# Patient Record
Sex: MALE | Race: WHITE | NOT HISPANIC OR LATINO | Employment: FULL TIME | URBAN - METROPOLITAN AREA
[De-identification: names, ages, dates, MRNs, and addresses within clinical notes are randomized per-mention and may not be internally consistent; named-entity substitution may affect disease eponyms.]

---

## 2018-06-21 ENCOUNTER — HISTORICAL (OUTPATIENT)
Dept: ADMINISTRATIVE | Facility: HOSPITAL | Age: 48
End: 2018-06-21

## 2018-06-21 LAB
ABS NEUT (OLG): 3
ALBUMIN SERPL-MCNC: 4.8 GM/DL (ref 3.4–5)
ALBUMIN/GLOB SERPL: 1.71 {RATIO} (ref 1.5–2.5)
ALP SERPL-CCNC: 38 UNIT/L (ref 38–126)
ALT SERPL-CCNC: 19 UNIT/L (ref 7–52)
APPEARANCE, UA: CLEAR
AST SERPL-CCNC: 23 UNIT/L (ref 15–37)
BACTERIA #/AREA URNS AUTO: ABNORMAL /HPF
BILIRUB SERPL-MCNC: 0.8 MG/DL (ref 0.2–1)
BILIRUB UR QL STRIP: NEGATIVE MG/DL
BILIRUBIN DIRECT+TOT PNL SERPL-MCNC: 0.2 MG/DL (ref 0–0.5)
BILIRUBIN DIRECT+TOT PNL SERPL-MCNC: 0.6 MG/DL
BUN SERPL-MCNC: 19 MG/DL (ref 7–18)
CALCIUM SERPL-MCNC: 9.2 MG/DL (ref 8.5–10)
CHLORIDE SERPL-SCNC: 104 MMOL/L (ref 98–107)
CHOLEST SERPL-MCNC: 235 MG/DL (ref 0–200)
CHOLEST/HDLC SERPL: 4.9 {RATIO}
CO2 SERPL-SCNC: 26 MMOL/L (ref 21–32)
COLOR UR: YELLOW
CREAT SERPL-MCNC: 1.03 MG/DL (ref 0.6–1.3)
ERYTHROCYTE [DISTWIDTH] IN BLOOD BY AUTOMATED COUNT: 12.9 % (ref 11.5–17)
GLOBULIN SER-MCNC: 2.8 GM/DL (ref 1.2–3)
GLUCOSE (UA): NEGATIVE MG/DL
GLUCOSE SERPL-MCNC: 99 MG/DL (ref 74–106)
HCT VFR BLD AUTO: 42.8 % (ref 42–52)
HDLC SERPL-MCNC: 48 MG/DL (ref 35–60)
HGB BLD-MCNC: 14.2 GM/DL (ref 14–18)
HGB UR QL STRIP: ABNORMAL UNIT/L
KETONES UR QL STRIP: NEGATIVE MG/DL
LDLC SERPL CALC-MCNC: 155 MG/DL (ref 0–129)
LEUKOCYTE ESTERASE UR QL STRIP: NEGATIVE UNIT/L
LYMPHOCYTES # BLD AUTO: 1.9 X10(3)/MCL (ref 0.6–3.4)
LYMPHOCYTES NFR BLD AUTO: 33.2 % (ref 13–40)
MCH RBC QN AUTO: 31.7 PG (ref 27–31.2)
MCHC RBC AUTO-ENTMCNC: 33 GM/DL (ref 32–36)
MCV RBC AUTO: 96 FL (ref 80–94)
MONOCYTES # BLD AUTO: 0.8 X10(3)/MCL (ref 0–1.8)
MONOCYTES NFR BLD AUTO: 13.5 % (ref 0.1–24)
NEUTROPHILS NFR BLD AUTO: 53.3 % (ref 47–80)
NITRITE UR QL STRIP.AUTO: NEGATIVE
PH UR STRIP: 5.5 [PH]
PLATELET # BLD AUTO: 235 X10(3)/MCL (ref 130–400)
PMV BLD AUTO: 8.8 FL
POTASSIUM SERPL-SCNC: 4.4 MMOL/L (ref 3.5–5.1)
PROT SERPL-MCNC: 7.6 GM/DL (ref 6.4–8.2)
PROT UR QL STRIP: NEGATIVE MG/DL
PSA SERPL-MCNC: 1.2 NG/ML (ref 0–2.5)
RBC # BLD AUTO: 4.48 X10(6)/MCL (ref 4.7–6.1)
RBC #/AREA URNS HPF: ABNORMAL /HPF
SODIUM SERPL-SCNC: 138 MMOL/L (ref 136–145)
SP GR UR STRIP: 1.02
SQUAMOUS EPITHELIAL, UA: ABNORMAL /LPF
TRIGL SERPL-MCNC: 195 MG/DL (ref 30–150)
UROBILINOGEN UR STRIP-ACNC: 0.2 MG/DL
VLDLC SERPL CALC-MCNC: 39 MG/DL
WBC # SPEC AUTO: 5.7 X10(3)/MCL (ref 4.5–11.5)
WBC #/AREA URNS AUTO: ABNORMAL /[HPF]

## 2019-09-14 ENCOUNTER — HISTORICAL (OUTPATIENT)
Dept: URGENT CARE | Facility: CLINIC | Age: 49
End: 2019-09-14

## 2019-09-14 ENCOUNTER — HISTORICAL (OUTPATIENT)
Dept: ADMINISTRATIVE | Facility: HOSPITAL | Age: 49
End: 2019-09-14

## 2019-09-14 LAB
ABS NEUT (OLG): 7.44 X10(3)/MCL (ref 2.1–9.2)
BASOPHILS # BLD AUTO: 0 X10(3)/MCL (ref 0–0.2)
BASOPHILS NFR BLD AUTO: 0 %
EOSINOPHIL # BLD AUTO: 0 X10(3)/MCL (ref 0–0.9)
EOSINOPHIL NFR BLD AUTO: 0 %
ERYTHROCYTE [DISTWIDTH] IN BLOOD BY AUTOMATED COUNT: 13.2 % (ref 11.5–17)
HCT VFR BLD AUTO: 40.5 % (ref 42–52)
HGB BLD-MCNC: 13.5 GM/DL (ref 14–18)
INFLUENZA A ANTIGEN, POC: NEGATIVE
INFLUENZA B ANTIGEN, POC: NEGATIVE
LYMPHOCYTES # BLD AUTO: 0.8 X10(3)/MCL (ref 0.6–4.6)
LYMPHOCYTES NFR BLD AUTO: 9 %
MCH RBC QN AUTO: 30.9 PG (ref 27–31)
MCHC RBC AUTO-ENTMCNC: 33.3 GM/DL (ref 33–36)
MCV RBC AUTO: 92.7 FL (ref 80–94)
MONOCYTES # BLD AUTO: 1 X10(3)/MCL (ref 0.1–1.3)
MONOCYTES NFR BLD AUTO: 11 %
NEUTROPHILS # BLD AUTO: 7.44 X10(3)/MCL (ref 2.1–9.2)
NEUTROPHILS NFR BLD AUTO: 79 %
PLATELET # BLD AUTO: 223 X10(3)/MCL (ref 130–400)
PMV BLD AUTO: 10.5 FL (ref 9.4–12.4)
RAPID GROUP A STREP (OHS): NEGATIVE
RBC # BLD AUTO: 4.37 X10(6)/MCL (ref 4.7–6.1)
WBC # SPEC AUTO: 9.4 X10(3)/MCL (ref 4.5–11.5)

## 2019-10-22 ENCOUNTER — HISTORICAL (OUTPATIENT)
Dept: ADMINISTRATIVE | Facility: HOSPITAL | Age: 49
End: 2019-10-22

## 2022-04-10 ENCOUNTER — HISTORICAL (OUTPATIENT)
Dept: ADMINISTRATIVE | Facility: HOSPITAL | Age: 52
End: 2022-04-10

## 2022-04-29 VITALS
BODY MASS INDEX: 29.98 KG/M2 | DIASTOLIC BLOOD PRESSURE: 88 MMHG | WEIGHT: 209.69 LBS | HEIGHT: 70 IN | OXYGEN SATURATION: 96 % | SYSTOLIC BLOOD PRESSURE: 123 MMHG | SYSTOLIC BLOOD PRESSURE: 144 MMHG | OXYGEN SATURATION: 97 % | DIASTOLIC BLOOD PRESSURE: 78 MMHG | DIASTOLIC BLOOD PRESSURE: 84 MMHG | WEIGHT: 209.44 LBS | HEIGHT: 70 IN | BODY MASS INDEX: 30.02 KG/M2 | SYSTOLIC BLOOD PRESSURE: 124 MMHG

## 2022-05-03 NOTE — HISTORICAL [facility]
This is a historical note converted from Britta. Formatting and pictures may have been removed.  Please reference Britta for original formatting and attached multimedia. Chief Complaint  fever off and on x 5 days, dry cough, postnasal drip, sinus congestion  rash on back x 3 days  History of Present Illness  49-year-old white male presents to clinic?complaining of dry cough fever postnasal drip for the past 5 days. ?Now with a?red mildly itchy rash on the back. ?Went to [facility] on Monday flu was negative but was put on Tamiflu anyway because?his symptoms indicated flu?possibly. ?Has been taking the Tamiflu but no change if he does not take Advil or Tylenol his fever will return  Review of Systems  Constitutional_Per HPI  Eye_Per HPI  ENMT_Per HPI  Cardiovascular_Per HPI  Respiratory: Per HPI  Gastrointestinal_Per HPI  Muscular_Per HPI  Neuro: Per HPI  Physical Exam  Vitals & Measurements  T:?37.7? ?C (Oral)? HR:?127(Peripheral)? RR:?18? BP:?123/84? SpO2:?96%?  HT:?178?cm? WT:?95.1?kg? BMI:?30.02?  General_well-developed well-nourished in no acute distress  Eye_clear conjunctiva  HENT_TMs/ear canals clear, oropharynx without erythema/exudate, oropharynx and nasal mucosal surfaces moist, no maxillary/frontal sinus tenderness to palpation  Neck_no lymphadenopathy  Respiratory_rhonchi left lower lobe  Cardiovascular_regular rate and rhythm without murmurs, gallops or rubs  Integumentary_no rashes  ?  ?  Assessment/Plan  1.?Fever?R50.9,?Fever?R50.9  Ordered:  cefTRIAXone, 1 gm, form: Injection, IM, Once, first dose 09/14/19 11:00:00 CDT, stop date 09/14/19 11:00:00 CDT  CBC w/ Auto Diff, Stat collect, 09/14/19 10:08:00 CDT, Blood, Order for future visit, Stop date 09/14/19 10:08:00 CDT, Lab Collect, No Charge, Fever, 09/14/19 10:08:00 CDT  ?  2.?Pneumonia?J18.9  ?Start taking the antibiotic as soon as possible.? Cough medicine may make you drowsy do not drink or drive if you take it.? You need a repeat chest x-ray in 4 to  6 weeks to make sure the pneumonia has resolved.? Seek medical attention immediately if your symptoms persist or worsen. ?Tylenol or ibuprofen as needed for fever. ?Stay hydrated get plenty of rest  Ordered:  cefTRIAXone, 1 gm, form: Injection, IM, Once, first dose 09/14/19 11:00:00 CDT, stop date 09/14/19 11:00:00 CDT  ?   Problem List/Past Medical History  Ongoing  Hypercholesteremia  Obesity  Wellness examination  Historical  No qualifying data  Procedure/Surgical History  Rhinoplasty   Medications  aspirin 81 mg oral tablet, 81 mg= 1 tab(s), Oral, Daily,? ?Not Taking, Completed Rx  oseltamivir 75 mg oral capsule, 75 mg= 1 cap(s), Oral, BID,? ?Not taking  Rocephin 1 g injection, 1 gm, IM, Once  Allergies  No Known Medication Allergies  Social History  Abuse/Neglect  No, 09/14/2019  Alcohol  1-2 times per week, 04/04/2016  Tobacco  Former smoker, quit more than 30 days ago, N/A, 09/14/2019  Family History  Family history is negative  Health Maintenance  Health Maintenance  ???Pending?(in the next year)  ??? ??OverDue  ??? ? ? ?Diabetes Screening due??and every?  ??? ? ? ?Aspirin Therapy for CVD Prevention due??04/04/17??and every 1??year(s)  ??? ? ? ?Alcohol Misuse Screening due??01/01/19??and every 1??year(s)  ??? ??Due?  ??? ? ? ?ADL Screening due??09/14/19??and every 1??year(s)  ??? ? ? ?Colorectal Screening due??09/14/19??and every?  ??? ? ? ?Depression Screening due??09/14/19??and every?  ??? ? ? ?Influenza Vaccine due??09/14/19??and every?  ??? ? ? ?Tetanus Vaccine due??09/14/19??and every 10??year(s)  ??? ??Due In Future?  ??? ? ? ?Obesity Screening not due until??01/01/20??and every 1??year(s)  ??? ? ? ?Blood Pressure Screening not due until??09/13/20??and every 1??year(s)  ??? ? ? ?Body Mass Index Check not due until??09/13/20??and every 1??year(s)  ???Satisfied?(in the past 1 year)  ??? ??Satisfied?  ??? ? ? ?Blood Pressure Screening on??09/14/19.??Satisfied by Alex Solano  ??? ? ? ?Body Mass  Index Check on??09/14/19.??Satisfied by Alex Solano  ??? ? ? ?Obesity Screening on??09/14/19.??Satisfied by Alex Solano  ?  Diagnostic Results  Left lower lobe pneumonia

## 2022-05-03 NOTE — HISTORICAL [facility]
This is a historical note converted from [place]. Formatting and pictures may have been removed.  Please reference Britta for original formatting and attached multimedia. Chief Complaint  seen on 9/14, dx with pneumonia. requesting f/u. c/o head congestion  History of Present Illness  49-year-old white male presents to clinic for?his pneumonia diagnosis?for repeat chest x-ray. ?States after about 4 days of taking antibiotics fever broke and his symptoms resolved. ?Today also complaining of postnasal drip. ?States he does have a history of allergic rhinitis.? Has been taking Allegra-D only.  Review of Systems  Constitutional_Per HPI  Eye_Per HPI  ENMT_Per HPI  Cardiovascular_Per HPI  Respiratory: Per HPI  Gastrointestinal_Per HPI  Muscular_Per HPI  Neuro: Per HPI  Physical Exam  Vitals & Measurements  T:?37.1? ?C (Oral)? HR:?99(Peripheral)? RR:?18? BP:?144/88? SpO2:?97%?  HT:?178?cm? WT:?95?kg? BMI:?29.98?  General_well-developed well-nourished in no acute distress  Eye_ clear conjunctiva, eyelids normal  HENT_nasal drip is present.? No sinus tenderness to palpation.  Neck_full range of motion  Respiratory_clear to auscultation bilaterally  Cardiovascular_regular rate and rhythm without murmurs, gallops or rubs  Integumentary_no rashes or skin lesions present  ?  Assessment/Plan  1.?History of pneumonia?Z87.01  ?Your x-ray has been sent to radiologist for an official report we will call you we will get the results.  Ordered:  Office/Outpatient Visit Level 3 Established [id number] PC, History of pneumonia  Allergic rhinitis, [facility], 10/22/19 12:46:00 CDT  XR Chest 2 Views, Routine, 10/22/19 12:45:00 CDT, Other (please specify), Pneumonia 5 weeks ago, None, Ambulatory, Rad Type, History of pneumonia, Not Scheduled, 10/22/19 12:45:00 CDT  ?  2.?Allergic rhinitis?J30.9  Start taking an allergy pill daily such as claritin, zyrtec, allegra or xyzal. Also start using a nasal steroid spray such as flonase or nasacort. They can  all be purchased over the counter. ?Monitor for fever take Tylenol or ibuprofen as needed for fever pain or headache. ?Stay hydrated. ?Get plenty rest. ?If symptoms persist would recommend follow-up with an ear nose throat doctor for further evaluation  Ordered:  Office/Outpatient Visit Level 3 Established 58010 PC, History of pneumonia  Allergic rhinitis, [facility], 10/22/19 12:46:00 CDT  ?  Orders:  predniSONE, 40 mg = 2 tab(s), Oral, Daily, X 5 day(s), # 10 tab(s), 0 Refill(s), Pharmacy: [facility]  DWAYNE Chang   Problem List/Past Medical History  Ongoing  Hypercholesteremia  Obesity  Wellness examination  Historical  No qualifying data  Procedure/Surgical History  Rhinoplasty   Medications  codeine-guaifenesin 10 mg-100 mg/5 mL oral syrup, 10 mL, Oral, q6hr,? ?Not Taking, Completed Rx  diclofenac sodium 75 mg oral delayed release tablet, 75 mg= 1 tab(s), Oral, BID,? ?Not taking  doxycycline monohydrate 100 mg oral capsule, 100 mg= 1 cap(s), Oral, BID,? ?Not Taking, Completed Rx  gabapentin 300 mg oral capsule, 300 mg= 1 cap(s), Oral, qPM,? ?Not Taking, Completed Rx  oseltamivir 75 mg oral capsule, 75 mg= 1 cap(s), Oral, BID,? ?Not Taking, Completed Rx  prednisONE 20 mg oral tablet, 40 mg= 2 tab(s), Oral, Daily  Allergies  No Known Medication Allergies  Social History  Abuse/Neglect  No, 10/22/2019  No, 09/14/2019  Alcohol  1-2 times per week, 04/04/2016  Substance Use  Never, 10/22/2019  Tobacco  Former smoker, quit more than 30 days ago, N/A, 10/22/2019  Family History  Family history is negative  Health Maintenance  Health Maintenance  ???Pending?(in the next year)  ??? ??OverDue  ??? ? ? ?Diabetes Screening due??and every?  ??? ? ? ?Aspirin Therapy for CVD Prevention due??04/04/17??and every 1??year(s)  ??? ? ? ?Alcohol Misuse Screening due??01/01/19??and every 1??year(s)  ??? ??Due?  ??? ? ? ?ADL Screening due??10/22/19??and every 1??year(s)  ??? ? ? ?Colorectal Screening due??10/22/19??and every?  ???  ? ? ?Depression Screening due??10/22/19??and every?  ??? ? ? ?Influenza Vaccine due??10/22/19??and every?  ??? ? ? ?Tetanus Vaccine due??10/22/19??and every 10??year(s)  ??? ??Due In Future?  ??? ? ? ?Obesity Screening not due until??01/01/20??and every 1??year(s)  ??? ? ? ?Blood Pressure Screening not due until??10/21/20??and every 1??year(s)  ??? ? ? ?Body Mass Index Check not due until??10/21/20??and every 1??year(s)  ???Satisfied?(in the past 1 year)  ??? ??Satisfied?  ??? ? ? ?Blood Pressure Screening on??10/22/19.??Satisfied by Uma Elise  ??? ? ? ?Body Mass Index Check on??10/22/19.??Satisfied by Uma Elise  ??? ? ? ?Influenza Vaccine on??10/22/19.??Satisfied by Uma Elise  ??? ? ? ?Obesity Screening on??10/22/19.??Satisfied by Uma Elise  ?  Diagnostic Results  No infiltrate

## 2022-05-25 ENCOUNTER — OFFICE VISIT (OUTPATIENT)
Dept: URGENT CARE | Facility: CLINIC | Age: 52
End: 2022-05-25
Payer: COMMERCIAL

## 2022-05-25 VITALS
TEMPERATURE: 99 F | RESPIRATION RATE: 18 BRPM | HEART RATE: 93 BPM | DIASTOLIC BLOOD PRESSURE: 85 MMHG | SYSTOLIC BLOOD PRESSURE: 145 MMHG | BODY MASS INDEX: 29.35 KG/M2 | HEIGHT: 70 IN | OXYGEN SATURATION: 99 % | WEIGHT: 205 LBS

## 2022-05-25 DIAGNOSIS — N50.819 PAIN IN TESTICLE, UNSPECIFIED LATERALITY: Primary | ICD-10-CM

## 2022-05-25 PROCEDURE — 99214 OFFICE O/P EST MOD 30 MIN: CPT | Mod: ,,, | Performed by: FAMILY MEDICINE

## 2022-05-25 PROCEDURE — 99214 PR OFFICE/OUTPT VISIT, EST, LEVL IV, 30-39 MIN: ICD-10-PCS | Mod: ,,, | Performed by: FAMILY MEDICINE

## 2022-05-25 PROCEDURE — 3008F PR BODY MASS INDEX (BMI) DOCUMENTED: ICD-10-PCS | Mod: CPTII,,, | Performed by: FAMILY MEDICINE

## 2022-05-25 PROCEDURE — 1160F RVW MEDS BY RX/DR IN RCRD: CPT | Mod: CPTII,,, | Performed by: FAMILY MEDICINE

## 2022-05-25 PROCEDURE — 1160F PR REVIEW ALL MEDS BY PRESCRIBER/CLIN PHARMACIST DOCUMENTED: ICD-10-PCS | Mod: CPTII,,, | Performed by: FAMILY MEDICINE

## 2022-05-25 PROCEDURE — 3077F PR MOST RECENT SYSTOLIC BLOOD PRESSURE >= 140 MM HG: ICD-10-PCS | Mod: CPTII,,, | Performed by: FAMILY MEDICINE

## 2022-05-25 PROCEDURE — 3077F SYST BP >= 140 MM HG: CPT | Mod: CPTII,,, | Performed by: FAMILY MEDICINE

## 2022-05-25 PROCEDURE — 1159F MED LIST DOCD IN RCRD: CPT | Mod: CPTII,,, | Performed by: FAMILY MEDICINE

## 2022-05-25 PROCEDURE — 1159F PR MEDICATION LIST DOCUMENTED IN MEDICAL RECORD: ICD-10-PCS | Mod: CPTII,,, | Performed by: FAMILY MEDICINE

## 2022-05-25 PROCEDURE — 3008F BODY MASS INDEX DOCD: CPT | Mod: CPTII,,, | Performed by: FAMILY MEDICINE

## 2022-05-25 PROCEDURE — 3079F PR MOST RECENT DIASTOLIC BLOOD PRESSURE 80-89 MM HG: ICD-10-PCS | Mod: CPTII,,, | Performed by: FAMILY MEDICINE

## 2022-05-25 PROCEDURE — 3079F DIAST BP 80-89 MM HG: CPT | Mod: CPTII,,, | Performed by: FAMILY MEDICINE

## 2022-05-25 RX ORDER — DICLOFENAC SODIUM 50 MG/1
50 TABLET, DELAYED RELEASE ORAL 2 TIMES DAILY PRN
Qty: 14 TABLET | Refills: 0 | Status: SHIPPED | OUTPATIENT
Start: 2022-05-25 | End: 2022-06-01

## 2022-05-25 RX ORDER — TRAMADOL HYDROCHLORIDE 50 MG/1
50 TABLET ORAL EVERY 6 HOURS
Qty: 12 TABLET | Refills: 0 | Status: SHIPPED | OUTPATIENT
Start: 2022-05-25 | End: 2022-05-28

## 2022-05-25 RX ORDER — LEVOFLOXACIN 500 MG/1
500 TABLET, FILM COATED ORAL DAILY
Qty: 10 TABLET | Refills: 0 | Status: SHIPPED | OUTPATIENT
Start: 2022-05-25 | End: 2022-06-04

## 2022-05-25 NOTE — PATIENT INSTRUCTIONS
Medication sent to pharmacy.  Start them today . pain medication may cause drowsiness.  Do not drink alcohol or drive when taking it.  You have been referred to Urology.  They call you for an appointment.  If your pain worsens go to the emergency department immediately

## 2022-05-25 NOTE — PROGRESS NOTES
"Subjective:       Patient ID: Richard Dean is a 52 y.o. male.    Vitals:  height is 5' 10" (1.778 m) and weight is 93 kg (205 lb). His oral temperature is 98.5 °F (36.9 °C). His blood pressure is 145/85 (abnormal) and his pulse is 93. His respiration is 18 and oxygen saturation is 99%.     Chief Complaint: Groin Pain (X 10 days )    52 y.o. male arrived to clinic complaining of groin pain x 10 days. Pt stated he have been taking ibuprofen for pain.  States he felt like it began on the right side and has traveled to the left side.  States it is a dull ache.  2/10 in severity.  States the pain will wake him up at night.  Has been steady for the past 10 days.  Denies any other symptoms.  Patient states she does sit for prolonged times at work.  Denies any testicular or scrotal swelling    Groin Pain  The patient's primary symptoms include testicular pain. The patient's pertinent negatives include no penile discharge, penile pain or scrotal swelling. This is a new problem. Episode onset: 10 days. The problem occurs constantly. The problem has been unchanged. The pain is mild. Pertinent negatives include no dysuria, frequency or urgency. Exacerbated by: laying down. Treatments tried: ibuprofen. The treatment provided mild relief. He is sexually active. No, his partner does not have an STD.       Constitution: Negative.   HENT: Negative.    Neck: neck negative.   Cardiovascular: Negative.    Eyes: Negative.    Respiratory: Negative.    Gastrointestinal: Negative.    Genitourinary: Positive for testicular pain. Negative for dysuria, frequency, urgency, hematuria, painful intercourse, penile discharge, painful ejaculation, penile pain and scrotal swelling.   Musculoskeletal: Negative.    Skin: Negative.    Allergic/Immunologic: Negative.    Neurological: Negative.    Hematologic/Lymphatic: Negative.        Objective:      Physical Exam   Constitutional: He is oriented to person, place, and time.   Pulmonary/Chest: Effort " "normal.   Abdominal: Normal appearance.   Genitourinary: Right testis shows mass. Right testis shows no swelling and no tenderness. Left testis shows no mass, no swelling and no tenderness. No penile erythema. Penis exhibits no lesions. No discharge found.         Comments: There is no swelling of the scrotal sac.  No swelling or tenderness of the testicles.  There is a mass that is palpated at the lower pole of the right testicle      Neurological: He is alert and oriented to person, place, and time.   Psychiatric: His behavior is normal. Mood and judgment normal.   Vitals reviewed.         Previous History      Review of patient's allergies indicates:  No Known Allergies    History reviewed. No pertinent past medical history.  Current Outpatient Medications   Medication Instructions    diclofenac (VOLTAREN) 50 mg, Oral, 2 times daily PRN    levoFLOXacin (LEVAQUIN) 500 mg, Oral, Daily     History reviewed. No pertinent surgical history.  History reviewed. No pertinent family history.    Social History     Tobacco Use    Smoking status: Never Smoker    Smokeless tobacco: Never Used   Substance Use Topics    Alcohol use: Yes     Alcohol/week: 3.0 standard drinks     Types: 3 Shots of liquor per week     Comment: 2 or 3 cups of liquor a day        Physical Exam      Vital Signs Reviewed   BP (!) 145/85 (BP Location: Left arm, Patient Position: Sitting)   Pulse 93   Temp 98.5 °F (36.9 °C) (Oral)   Resp 18   Ht 5' 10" (1.778 m)   Wt 93 kg (205 lb)   SpO2 99%   BMI 29.41 kg/m²        Procedures    Procedures     Labs     Results for orders placed or performed in visit on 09/14/19   Differential   Result Value Ref Range    Mono # 1.0 0.1 - 1.3 x10(3)/mcL    Neut # 7.44 2.10 - 9.20 x10(3)/mcL    Basophil % 0 %    Eos # 0.0 0.0 - 0.9 x10(3)/mcL    Lymph # 0.8 0.6 - 4.6 x10(3)/mcL    Baso # 0.0 0.0 - 0.2 x10(3)/mcL    Neut % 79 %    Lymph % 9 %    Eos % 0 %    Mono % 11 %   CBC Auto Differential   Result Value " Ref Range    Abs Neut 7.44 2.10 - 9.20 x10(3)/mcL    MPV 10.5 9.4 - 12.4 fL    MCH 30.9 27.0 - 31.0 pg    Hct 40.5 (L) 42.0 - 52.0 %    MCHC 33.3 33.0 - 36.0 gm/dL    MCV 92.7 80.0 - 94.0 fL    Hgb 13.5 (L) 14.0 - 18.0 gm/dL    WBC 9.4 4.5 - 11.5 x10(3)/mcL    RBC 4.37 (L) 4.70 - 6.10 x10(6)/mcL    RDW 13.2 11.5 - 17.0 %    Platelet 223 130 - 400 x10(3)/mcL         Assessment:       1. Pain in testicle, unspecified laterality          Plan:       Medication sent to pharmacy.  Start them today . pain medication may cause drowsiness.  Do not drink alcohol or drive when taking it.  You have been referred to Urology.  They call you for an appointment.  If your pain worsens go to the emergency department immediately    Pain in testicle, unspecified laterality  -     Ambulatory referral/consult to Urology    Other orders  -     levoFLOXacin (LEVAQUIN) 500 MG tablet; Take 1 tablet (500 mg total) by mouth once daily. for 10 days  Dispense: 10 tablet; Refill: 0  -     diclofenac (VOLTAREN) 50 MG EC tablet; Take 1 tablet (50 mg total) by mouth 2 (two) times daily as needed (pain).  Dispense: 14 tablet; Refill: 0

## 2022-06-15 PROBLEM — M51.36 DDD (DEGENERATIVE DISC DISEASE), LUMBAR: Status: ACTIVE | Noted: 2022-06-15

## 2022-06-15 PROBLEM — R53.83 FATIGUE: Status: ACTIVE | Noted: 2022-06-15

## 2022-06-15 PROBLEM — E78.00 HYPERCHOLESTEROLEMIA: Status: ACTIVE | Noted: 2022-06-15

## 2023-10-08 ENCOUNTER — OFFICE VISIT (OUTPATIENT)
Dept: URGENT CARE | Facility: CLINIC | Age: 53
End: 2023-10-08
Payer: COMMERCIAL

## 2023-10-08 VITALS
RESPIRATION RATE: 17 BRPM | HEIGHT: 70 IN | SYSTOLIC BLOOD PRESSURE: 131 MMHG | OXYGEN SATURATION: 97 % | TEMPERATURE: 99 F | BODY MASS INDEX: 30.06 KG/M2 | HEART RATE: 89 BPM | WEIGHT: 210 LBS | DIASTOLIC BLOOD PRESSURE: 87 MMHG

## 2023-10-08 DIAGNOSIS — M54.12 CERVICAL RADICULOPATHY: Primary | ICD-10-CM

## 2023-10-08 PROCEDURE — 96372 PR INJECTION,THERAP/PROPH/DIAG2ST, IM OR SUBCUT: ICD-10-PCS | Mod: ,,, | Performed by: FAMILY MEDICINE

## 2023-10-08 PROCEDURE — 96372 THER/PROPH/DIAG INJ SC/IM: CPT | Mod: ,,, | Performed by: FAMILY MEDICINE

## 2023-10-08 PROCEDURE — 99214 OFFICE O/P EST MOD 30 MIN: CPT | Mod: 25,,, | Performed by: FAMILY MEDICINE

## 2023-10-08 PROCEDURE — 99214 PR OFFICE/OUTPT VISIT, EST, LEVL IV, 30-39 MIN: ICD-10-PCS | Mod: 25,,, | Performed by: FAMILY MEDICINE

## 2023-10-08 RX ORDER — BETAMETHASONE SODIUM PHOSPHATE AND BETAMETHASONE ACETATE 3; 3 MG/ML; MG/ML
12 INJECTION, SUSPENSION INTRA-ARTICULAR; INTRALESIONAL; INTRAMUSCULAR; SOFT TISSUE
Status: COMPLETED | OUTPATIENT
Start: 2023-10-08 | End: 2023-10-08

## 2023-10-08 RX ORDER — CYCLOBENZAPRINE HCL 10 MG
10 TABLET ORAL 3 TIMES DAILY PRN
Qty: 30 TABLET | Refills: 0 | Status: SHIPPED | OUTPATIENT
Start: 2023-10-08 | End: 2023-10-12

## 2023-10-08 RX ADMIN — BETAMETHASONE SODIUM PHOSPHATE AND BETAMETHASONE ACETATE 12 MG: 3; 3 INJECTION, SUSPENSION INTRA-ARTICULAR; INTRALESIONAL; INTRAMUSCULAR; SOFT TISSUE at 09:10

## 2023-10-08 NOTE — PROGRESS NOTES
"Subjective:      Patient ID: Richard Dean is a 53 y.o. male.    Vitals:  height is 5' 10" (1.778 m) and weight is 95.3 kg (210 lb). His temperature is 98.5 °F (36.9 °C). His blood pressure is 131/87 and his pulse is 89. His respiration is 17 and oxygen saturation is 97%.     Chief Complaint: Torticollis ( Patient is a 53 y.o. male who presents to urgent care with complaints of pinched nerve in neck and has stiffness  x 2  days. Alleviating factors include OTC medication  with no relief. Patient denies trauma or heavy lifting but has had pinched nerves in the past and states it feel the same. )     Patient is a 53 y.o. male who presents to urgent care with complaints of pinched nerve in neck and has stiffness  x 2  days. Alleviating factors include OTC medication  with no relief. Patient denies trauma or heavy lifting but has had pinched nerves in the past and states it feel the same.     Torticollis  Associated symptoms include neck pain.     Constitution: Negative.   HENT: Negative.     Neck: Positive for neck pain, neck stiffness and degenerative disc disease.   Cardiovascular: Negative.    Eyes: Negative.    Respiratory: Negative.     Gastrointestinal: Negative.    Endocrine: negative.   Genitourinary: Negative.    Musculoskeletal:  Positive for pain and history of spine disorder.   Skin: Negative.    Psychiatric/Behavioral: Negative.        Objective:     Physical Exam   Constitutional: He is oriented to person, place, and time. He appears well-developed. He is cooperative.  Non-toxic appearance. He does not appear ill. No distress.   HENT:   Head: Normocephalic and atraumatic.   Ears:   Right Ear: Hearing, tympanic membrane, external ear and ear canal normal.   Left Ear: Hearing, tympanic membrane, external ear and ear canal normal.   Nose: Nose normal. No mucosal edema, rhinorrhea or nasal deformity. No epistaxis. Right sinus exhibits no maxillary sinus tenderness and no frontal sinus tenderness. Left sinus " exhibits no maxillary sinus tenderness and no frontal sinus tenderness.   Mouth/Throat: Uvula is midline, oropharynx is clear and moist and mucous membranes are normal. No trismus in the jaw. Normal dentition. No uvula swelling. No oropharyngeal exudate, posterior oropharyngeal edema or posterior oropharyngeal erythema.   Eyes: Conjunctivae and lids are normal. No scleral icterus.   Neck: Trachea normal and phonation normal. Neck supple. Torticollis present. No edema present. No erythema present. No neck rigidity present. pain with movement present.   Cardiovascular: Normal rate, regular rhythm, normal heart sounds and normal pulses.   Pulmonary/Chest: Effort normal and breath sounds normal. No respiratory distress. He has no decreased breath sounds. He has no rhonchi.   Abdominal: Normal appearance.   Musculoskeletal: Normal range of motion.         General: No deformity. Normal range of motion.   Neurological: He is alert and oriented to person, place, and time. He exhibits normal muscle tone. Coordination normal.   Skin: Skin is warm, dry, intact, not diaphoretic and not pale.   Psychiatric: His speech is normal and behavior is normal. Judgment and thought content normal.   Nursing note and vitals reviewed.      Assessment:     1. Cervical radiculopathy        Plan:       Cervical radiculopathy  -     betamethasone acetate-betamethasone sodium phosphate injection 12 mg  -     cyclobenzaprine (FLEXERIL) 10 MG tablet; Take 1 tablet (10 mg total) by mouth 3 (three) times daily as needed for Muscle spasms.  Dispense: 30 tablet; Refill: 0    Rec PT/ vs Chiropractor therapy.   F/U with COUSIN Patrick Goldstein Naz  this week.

## 2023-10-12 ENCOUNTER — OFFICE VISIT (OUTPATIENT)
Dept: URGENT CARE | Facility: CLINIC | Age: 53
End: 2023-10-12
Payer: COMMERCIAL

## 2023-10-12 VITALS
HEIGHT: 70 IN | BODY MASS INDEX: 30.06 KG/M2 | RESPIRATION RATE: 17 BRPM | SYSTOLIC BLOOD PRESSURE: 135 MMHG | TEMPERATURE: 99 F | HEART RATE: 105 BPM | OXYGEN SATURATION: 97 % | WEIGHT: 210 LBS | DIASTOLIC BLOOD PRESSURE: 83 MMHG

## 2023-10-12 DIAGNOSIS — M54.12 CERVICAL RADICULOPATHY: Primary | ICD-10-CM

## 2023-10-12 PROCEDURE — 99214 PR OFFICE/OUTPT VISIT, EST, LEVL IV, 30-39 MIN: ICD-10-PCS | Mod: 25,,, | Performed by: FAMILY MEDICINE

## 2023-10-12 PROCEDURE — 96372 THER/PROPH/DIAG INJ SC/IM: CPT | Mod: ,,, | Performed by: FAMILY MEDICINE

## 2023-10-12 PROCEDURE — 96372 PR INJECTION,THERAP/PROPH/DIAG2ST, IM OR SUBCUT: ICD-10-PCS | Mod: ,,, | Performed by: FAMILY MEDICINE

## 2023-10-12 PROCEDURE — 99214 OFFICE O/P EST MOD 30 MIN: CPT | Mod: 25,,, | Performed by: FAMILY MEDICINE

## 2023-10-12 RX ORDER — KETOROLAC TROMETHAMINE 30 MG/ML
30 INJECTION, SOLUTION INTRAMUSCULAR; INTRAVENOUS
Status: COMPLETED | OUTPATIENT
Start: 2023-10-12 | End: 2023-10-12

## 2023-10-12 RX ORDER — TIZANIDINE 4 MG/1
4 TABLET ORAL 3 TIMES DAILY PRN
Qty: 21 TABLET | Refills: 0 | Status: SHIPPED | OUTPATIENT
Start: 2023-10-12 | End: 2023-10-19

## 2023-10-12 RX ORDER — HYDROCODONE BITARTRATE AND ACETAMINOPHEN 5; 325 MG/1; MG/1
1 TABLET ORAL EVERY 6 HOURS PRN
Qty: 16 TABLET | Refills: 0 | Status: SHIPPED | OUTPATIENT
Start: 2023-10-12 | End: 2023-10-16

## 2023-10-12 RX ORDER — DICLOFENAC SODIUM 50 MG/1
50 TABLET, DELAYED RELEASE ORAL 2 TIMES DAILY PRN
Qty: 14 TABLET | Refills: 0 | Status: SHIPPED | OUTPATIENT
Start: 2023-10-12 | End: 2023-10-19

## 2023-10-12 RX ORDER — PREDNISONE 20 MG/1
40 TABLET ORAL DAILY
Qty: 10 TABLET | Refills: 0 | Status: SHIPPED | OUTPATIENT
Start: 2023-10-12 | End: 2023-10-17

## 2023-10-12 RX ADMIN — KETOROLAC TROMETHAMINE 30 MG: 30 INJECTION, SOLUTION INTRAMUSCULAR; INTRAVENOUS at 11:10

## 2023-10-12 NOTE — PROGRESS NOTES
"Subjective:      Patient ID: Richard Dean is a 53 y.o. male.    Vitals:  height is 5' 10" (1.778 m) and weight is 95.3 kg (210 lb). His temperature is 98.8 °F (37.1 °C). His blood pressure is 135/83 and his pulse is 105. His respiration is 17 and oxygen saturation is 97%.     Chief Complaint: Neck Pain    53 y.o. male presents to clinic w/ c/o radiating pain in neck to R elbow w/ stiffness x4d. Pt was seen at this clinic 4d w/ same complaint. Reports he went to PT 3d ago w/ no improvement. Alleviating factors include Celestone injection, Flexeril, Ibuprofen w/ no improvement. Patient denies trauma fall injury or heavy lifting, numbness/tingling or weakness in extremities.  States the pain is at the base of the neck radiates towards the right shoulder and then some pain in the triceps area on the right side.  States he is unable to extend his neck but no issues with flexion or looking to the sides           Constitution: Negative.   HENT: Negative.     Neck: neck negative. Positive for neck pain.   Cardiovascular: Negative.    Eyes: Negative.    Respiratory: Negative.     Gastrointestinal: Negative.    Genitourinary: Negative.    Musculoskeletal:  Positive for pain.   Skin: Negative.    Allergic/Immunologic: Negative.    Neurological: Negative.    Hematologic/Lymphatic: Negative.       Objective:     Physical Exam   Constitutional: He is oriented to person, place, and time.  Non-toxic appearance. He does not appear ill. No distress.   HENT:   Head: Normocephalic and atraumatic.   Eyes: Conjunctivae are normal.   Abdominal: Normal appearance.   Musculoskeletal:      Cervical back: He exhibits tenderness (tenderness to palpation at the base of the neck.  Hypertonicity of the musculature in the area.  5/5 strength in the bilateral upper extremities.  Limited neck extension.  Flexion tilt and rotation okay).   Neurological: He is alert and oriented to person, place, and time.   Skin: Skin is not diaphoretic. " "  Psychiatric: His behavior is normal. Mood, judgment and thought content normal.   Vitals reviewed.         Previous History      Review of patient's allergies indicates:  No Known Allergies    History reviewed. No pertinent past medical history.  Current Outpatient Medications   Medication Instructions    diclofenac (VOLTAREN) 50 mg, Oral, 2 times daily PRN    HYDROcodone-acetaminophen (NORCO) 5-325 mg per tablet 1 tablet, Oral, Every 6 hours PRN    predniSONE (DELTASONE) 40 mg, Oral, Daily    tiZANidine (ZANAFLEX) 4 mg, Oral, 3 times daily PRN     History reviewed. No pertinent surgical history.  Family History   Problem Relation Age of Onset    Stroke Mother        Social History     Tobacco Use    Smoking status: Never     Passive exposure: Never    Smokeless tobacco: Never   Substance Use Topics    Alcohol use: Yes     Alcohol/week: 3.0 standard drinks of alcohol     Types: 3 Shots of liquor per week     Comment: 2 or 3 cups of liquor a day    Drug use: Never        Physical Exam      Vital Signs Reviewed   /83   Pulse 105   Temp 98.8 °F (37.1 °C)   Resp 17   Ht 5' 10" (1.778 m)   Wt 95.3 kg (210 lb)   SpO2 97%   BMI 30.13 kg/m²        Procedures    Procedures     Labs     Results for orders placed or performed in visit on 06/15/22   Testosterone   Result Value Ref Range    Testosterone Total 501.69 300.00 - 1,060.00 ng/dL   Comprehensive Metabolic Panel   Result Value Ref Range    Sodium Level 138 136 - 145 mmol/L    Potassium Level 4.4 3.5 - 5.1 mmol/L    Chloride 101 mmol/L    Carbon Dioxide 26 21 - 32 mmol/L    Glucose Level 104 74 - 106 mg/dL    Blood Urea Nitrogen 15.0 7.0 - 18.0 mg/dL    Creatinine 0.81 0.60 - 1.30 mg/dL    Calcium Level Total 9.7 8.5 - 10.1 mg/dL    Protein Total 7.3 6.4 - 8.2 gm/dL    Albumin Level 4.6 3.4 - 5.0 gm/dL    Globulin 2.7 1.2 - 3.0 gm/dL    Albumin/Globulin Ratio 1.7 1.5 - 2.0 ratio    Bilirubin Total 1.5 (H) 0.2 - 1.0 mg/dL    Alkaline Phosphatase 54 38 - 126 " unit/L    Alanine Aminotransferase 47 7 - 52 unit/L    Aspartate Aminotransferase 84 (H) 15 - 37 unit/L    Estimated GFR-Non  >60 mls/min/1.73/m2   Lipid Panel   Result Value Ref Range    Cholesterol Total 220 (H) 0 - 200 mg/dL    HDL Cholesterol 64 (H) 35 - 60 mg/dL    Triglyceride 186 (H) 30 - 150 mg/dL    Cholesterol/HDL Ratio 3     Very Low Density Lipoprotein 37     LDL Cholesterol 119.00 0.00 - 129.00 mg/dL   TSH   Result Value Ref Range    TSH 1.0515 0.3500 - 4.9400 uIU/mL   PSA, Screening   Result Value Ref Range    Prostate Specific Antigen 1.36 0.00 - 3.50 ng/mL   Vitamin D   Result Value Ref Range    Vit D 25 OH 35.8 30.0 - 80.0 ng/mL   CBC with Differential   Result Value Ref Range    WBC 4.7 4.5 - 11.5 x10(3)/mcL    RBC 4.63 (L) 4.70 - 6.10 x10(6)/mcL    Hgb 14.6 14.0 - 18.0 gm/dL    Hct 43.8 42.0 - 52.0 %    MCV 94.6 (H) 80.0 - 94.0 fL    MCH 31.5 (H) 27.0 - 31.0 pg    MCHC 33.3 33.0 - 36.0 mg/dL    RDW 12.9 11.5 - 17.0 %    Platelet 278 130 - 400 x10(3)/mcL    MPV 8.8 (L) 9.4 - 12.4 fL    Neut % 57.2 %    Lymph % 28.9 %    Mono % 13.9 %    Lymph # 1.4 0.6 - 4.6 x10(3)/mcL    Neut # 2.6 2.1 - 9.2 x10(3)/mcL    Mono # 0.7 0.1 - 1.3 x10(3)/mcL       Assessment:     1. Cervical radiculopathy        Plan:   Preliminary read of x-ray no significant arthritis or findings.  Official report is pending  Medications sent to pharmacy  Can start the oral anti-inflammatory diclofenac at dinnertime today.  Take it with food.  Do not take any Advil Aleve Motrin ibuprofen naproxen with it.  You can start the steroids now.  The muscle relaxer tizanidine and hydrocodone pain pill may cause drowsiness.  Best to take them at bedtime.  Do not drink alcohol or drive when taking them.  [facility].  They will call you for an appointment.  Follow-up with your PCP as needed.  Contact this clinic with any concerns    Cervical radiculopathy  -     XR Cervical  Spine 2 or 3 Views; Future; Expected date: 10/12/2023  -     Ambulatory referral/consult to Orthopedics    Other orders  -     ketorolac injection 30 mg  -     diclofenac (VOLTAREN) 50 MG EC tablet; Take 1 tablet (50 mg total) by mouth 2 (two) times daily as needed (pain).  Dispense: 14 tablet; Refill: 0  -     predniSONE (DELTASONE) 20 MG tablet; Take 2 tablets (40 mg total) by mouth once daily. for 5 days  Dispense: 10 tablet; Refill: 0  -     tiZANidine (ZANAFLEX) 4 MG tablet; Take 1 tablet (4 mg total) by mouth 3 (three) times daily as needed (neck pain).  Dispense: 21 tablet; Refill: 0  -     HYDROcodone-acetaminophen (NORCO) 5-325 mg per tablet; Take 1 tablet by mouth every 6 (six) hours as needed for Pain.  Dispense: 16 tablet; Refill: 0

## 2023-10-12 NOTE — PATIENT INSTRUCTIONS
Preliminary read of x-ray no significant arthritis or findings.  Official report is pending  Medications sent to pharmacy  Can start the oral anti-inflammatory diclofenac at dinnertime today.  Take it with food.  Do not take any Advil Aleve Motrin ibuprofen naproxen with it.  You can start the steroids now.  The muscle relaxer tizanidine and hydrocodone pain pill may cause drowsiness.  Best to take them at bedtime.  Do not drink alcohol or drive when taking them.  You have been [facility].  They will call you for an appointment.  Follow-up with your PCP as needed.  Contact this clinic with any concerns